# Patient Record
(demographics unavailable — no encounter records)

---

## 2025-02-27 NOTE — HISTORY OF PRESENT ILLNESS
[FreeTextEntry1] :  43 M CAD, s/p MI  Cardiac risk factors/ diagnoses: -HTN -Diabetes  Mellitus -Hyperlipidemia -Tobacco Use -Family history premature or other atherosclerotic heart disease  Denies chest pain, shortness of breath, orthopnea, PND palpitations or edema.  [place] [facility] developed URI, dx w strep throat, rx antibiotics. few days later,developed L CP, L arm numbness. [facility] ED, ekg no ST elev, trop elevated ad rising. Urgent cath 40% mLAD, significant myocardial bridge. LVEDP 17. Echo EF 50-55, . No PCI. Discharged , no symptoms since d/c   FRANC screen + snoring + day time somnolence + non restorative sleep + witnessed apnea

## 2025-02-27 NOTE — HISTORY OF PRESENT ILLNESS
[FreeTextEntry1] :  43 M CAD, s/p MI  Cardiac risk factors/ diagnoses: -HTN -Diabetes  Mellitus -Hyperlipidemia -Tobacco Use -Family history premature or other atherosclerotic heart disease  Denies chest pain, shortness of breath, orthopnea, PND palpitations or edema.  [place] [facility] 2/25 developed URI, dx w strep throat, rx antibiotics. few days later,developed L CP, L arm numbness. [facility] ED, ekg no ST elev, trop elevated ad rising. Urgent cath 40% mLAD, significant myocardial bridge. LVEDP 17. Echo EF 50-55, . No PCI. Discharged , no symptoms since d/c   FRANC screen + snoring + day time somnolence + non restorative sleep + witnessed apnea

## 2025-02-27 NOTE — ASSESSMENT
[FreeTextEntry1] : EKG NSR  labs in hsop 2/25 TCHol 137 HDL 20 LDL 84  HgA1C 5.8 K 3.6 creast 0.67 ESR 4 HSCRP 17  A/P 1. CAD, native heart, native artery, without angina 2. myocardial infarction, subsequent visit, ([facility]) 3. myocardial bridge Hx as above Clinical picture most consistent w [facility]OCA< inflammatory markers as above but doubt myopericardits No PCI performed for 40% stenosis - pt will provide angio [facility]  for review (? thrombus at site, ? myocardial bridge severity) - continue aggressive cardiac risk modification - cardiac rehab, stress test 1 month - continue metpp for bridge and MONCIA for now - stress test 1 mo for cardiac rehab   4. hyperlipidemia, target LDL < 70  labs as noted w LDL relatively low, but HDL very low This is likely the mechanism of premature plaque development Just started atorva repeat labs 6 weeks NMR Lipo then to confirm at LDL goal, and exclude LDLC/LDLP disparity   5.     suspect FRANC In view of symptoms as listed in HPI, and associated medical and cardiac conditions as noted, HST is indicated to assess for FRANC.

## 2025-02-27 NOTE — ASSESSMENT
[FreeTextEntry1] : EKG NSR  labs in hsop 2/25 TCHol 137 HDL 20 LDL 84  HgA1C 5.8 K 3.6 creast 0.67 ESR 4 HSCRP 17  A/P 1. CAD, native heart, native artery, without angina 2. myocardial infarction, subsequent visit, ([facility]) 3. myocardial bridge Hx as above Clinical picture most consistent w [facility]A< inflammatory markers as above but doubt myopericardits No PCI performed for 40% stenosis - pt will provide angio [facility]  for review (? thrombus at site, ? myocardial bridge severity) - continue aggressive cardiac risk modification - cardiac rehab, stress test 1 month - continue metpp for bridge and MONCIA for now - stress test 1 mo for cardiac rehab   4. hyperlipidemia, target LDL < 70  labs as noted w LDL relatively low, but HDL very low This is likely the mechanism of premature plaque development Just started atorva repeat labs 6 weeks NMR Lipo then to confirm at LDL goal, and exclude LDLC/LDLP disparity   5.     suspect FRANC In view of symptoms as listed in HPI, and associated medical and cardiac conditions as noted, HST is indicated to assess for FRANC.

## 2025-03-27 NOTE — HISTORY OF PRESENT ILLNESS
[FreeTextEntry1] : 43 M f/u CAD, s/p MI  Cardiac risk factors/ diagnoses: -HTN -Diabetes Mellitus -Hyperlipidemia -Tobacco Use -Family history premature or other atherosclerotic heart disease Denies chest pain, shortness of breath, orthopnea, PND palpitations or edema.  [facility] 2/25 developed URI, dx w strep throat, rx antibiotics. few days later,developed L CP, L arm numbness. [facility] ED, ekg no ST elev, trop elevated ad rising. Urgent cath 40% mLAD, significant myocardial bridge. LVEDP 17. Echo EF 50-55, . No PCI. Discharged , no symptoms , but since last visit now notes one episode L i\upper chest pain, which was not identical to MI pain  Of note, developed rash, MD advised stopping all meds, which he did x 1 week.   FRANC screen + snoring + day time somnolence + non restorative sleep + witnessed apnea

## 2025-03-27 NOTE — ASSESSMENT
[FreeTextEntry1] : A/P 1. CAD, native heart, native artery, without angina 2. myocardial infarction, subsequent visit, ([facility]) 3. myocardial bridge Hx as above Clinical picture most consistent w [facility], inflammatory markers as noted last visit  but doubt myopericardits No PCI performed for 40% stenosis - pt to try to obtain  angio [facility] for review (? thrombus at site, ? myocardial bridge severity) - continue aggressive cardiac risk modification - advised to resume meds, f/u w derm if needed - cardiac rehab, stress test 1 month - neg  - continue metpp for bridge and MONCIA for now - stress test 1 mo for cardiac rehab  4. hyperlipidemia, target LDL < 70 labs as noted w LDL relatively low, but HDL very low This is likely the mechanism of premature plaque development Just started atorva last visit, but stopped as per [facility] repeat labs 6 weeks NMR Lipo then to confirm at LDL goal, and exclude LDLC/LDLP disparity  5. suspect FRANC In view of symptoms as listed in HPI, and associated medical and cardiac conditions as noted, HST is indicated to assess for FRANC.